# Patient Record
Sex: FEMALE | NOT HISPANIC OR LATINO | URBAN - METROPOLITAN AREA
[De-identification: names, ages, dates, MRNs, and addresses within clinical notes are randomized per-mention and may not be internally consistent; named-entity substitution may affect disease eponyms.]

---

## 2017-01-06 ENCOUNTER — APPOINTMENT (RX ONLY)
Dept: URBAN - METROPOLITAN AREA CLINIC 355 | Facility: CLINIC | Age: 27
Setting detail: DERMATOLOGY
End: 2017-01-06

## 2017-01-06 DIAGNOSIS — L70.0 ACNE VULGARIS: ICD-10-CM | Status: IMPROVED

## 2017-01-06 DIAGNOSIS — L81.0 POSTINFLAMMATORY HYPERPIGMENTATION: ICD-10-CM

## 2017-01-06 PROCEDURE — 99213 OFFICE O/P EST LOW 20 MIN: CPT

## 2017-01-06 PROCEDURE — ? COUNSELING

## 2017-01-06 PROCEDURE — ? PRESCRIPTION

## 2017-01-06 PROCEDURE — ? TREATMENT REGIMEN

## 2017-01-06 RX ORDER — HYDROQUINONE 4 %
CREAM (GRAM) TOPICAL
Qty: 1 | Refills: 2 | Status: ERX | COMMUNITY
Start: 2017-01-06

## 2017-01-06 RX ADMIN — Medication: at 20:57

## 2017-01-06 ASSESSMENT — LOCATION SIMPLE DESCRIPTION DERM
LOCATION SIMPLE: RIGHT CHEEK
LOCATION SIMPLE: RIGHT FOREHEAD

## 2017-01-06 ASSESSMENT — LOCATION DETAILED DESCRIPTION DERM
LOCATION DETAILED: RIGHT INFERIOR CENTRAL MALAR CHEEK
LOCATION DETAILED: RIGHT INFERIOR MEDIAL FOREHEAD

## 2017-01-06 ASSESSMENT — LOCATION ZONE DERM: LOCATION ZONE: FACE

## 2017-01-06 NOTE — PROCEDURE: TREATMENT REGIMEN
Detail Level: Zone
Initiate Treatment: Continue gentle cleanser\\n\\nyou can use your brush but I would decrease to once daily\\n\\nyou can use your epiduo as needed\\n\\nmoisturizer as needed
Initiate Treatment: apply the hydroquinone to dark spots at night\\n\\nwear sunscreen during the day if you will be out for a long time

## 2017-01-09 RX ORDER — HYDROQUINONE 4 %
CREAM (GRAM) TOPICAL
Qty: 1 | Refills: 2 | Status: ERX